# Patient Record
Sex: MALE | Race: WHITE | NOT HISPANIC OR LATINO | Employment: OTHER | URBAN - METROPOLITAN AREA
[De-identification: names, ages, dates, MRNs, and addresses within clinical notes are randomized per-mention and may not be internally consistent; named-entity substitution may affect disease eponyms.]

---

## 2018-06-20 NOTE — PATIENT DISCUSSION
ERM, OU- NO TREATMENT INDICATED AT THIS TIME. CONSIDER CONSULT W/ DR. Joshi Speaker IF PROGRESSES. FOLLOW.

## 2018-07-05 NOTE — PATIENT DISCUSSION
It was explained that there is a higher risk of post-surgical swelling in the retina and that the vision may be limited as a result of the Epiretinal Membrane , however; it is believed that the cataract is contributing to the patient's visual impairment and surgery may significantly improve both the visual and functional status of the patient.

## 2018-07-05 NOTE — PATIENT DISCUSSION
Epiretinal Membrane (ERM) Counseling: An epiretinal membrane (also known as macular pucker, premacular fibrosis, surface wrinkling retinopathy or cellophane maculopathy) is a thin sheet of fibrous tissue that can develop on the surface of the retina. The risk of progression with retinal traction resulting in visual distortion was discussed with the patient. I have explained to the patient that successful management is dependent on patient compliance with treatment as prescribed and/or regular follow-up to monitor for possible progression.

## 2018-07-05 NOTE — PATIENT DISCUSSION
CATARACTS, OU- QUESTIONABLLY VISUALLY SIGNIFICANT. GV PT MRX TODAY FOR NEW GLS.  RTC IN 6 MO AFTER ERM [address] [facility]

## 2018-10-22 NOTE — PATIENT DISCUSSION
General: Patient contacted to schedule ERCP  in the OR. Patient scheduled for 11/05/18 at 7:15am  with 5:15am check in. Reviewed procedure information with patient including nothing to eat or drink for 8 hours prior to procedure, to have a  and to hold any asa/ibuprofen for 7 days prior to procedure.     Patient to have a pre-op physical prior to procedure. Already done just had surgery on 10/10/18    All information mailed to patient.

## 2019-01-15 NOTE — PATIENT DISCUSSION
EPIMACULAR MEMBRANE, OU: PATIENT NOT FUNCTIONALLY BOTHERED. SURGERY NOT RECOMMENDED AT THIS TIME. RETURN AS SCHEDULED FOR OCT / EVALUATION.

## 2019-03-07 NOTE — PATIENT DISCUSSION
CATARACTS, OU - NOT VISUALLY SIGNIFICANT. DISC OPTION OF [id number]. GLASSES RX GIVEN TO FILL IF DESIRES.

## 2021-03-12 ENCOUNTER — CONSULT (OUTPATIENT)
Dept: URBAN - METROPOLITAN AREA CLINIC 46 | Facility: CLINIC | Age: 76
End: 2021-03-12

## 2021-03-12 DIAGNOSIS — H40.1420: ICD-10-CM

## 2021-03-12 DIAGNOSIS — H25.042: ICD-10-CM

## 2021-03-12 DIAGNOSIS — H25.811: ICD-10-CM

## 2021-03-12 PROCEDURE — 92014 COMPRE OPH EXAM EST PT 1/>: CPT

## 2021-03-12 PROCEDURE — 76514 ECHO EXAM OF EYE THICKNESS: CPT

## 2021-03-12 PROCEDURE — 92250 FUNDUS PHOTOGRAPHY W/I&R: CPT

## 2021-03-12 ASSESSMENT — PACHYMETRY
OS_CT_UM: 528
OD_CT_UM: 515

## 2021-03-12 ASSESSMENT — VISUAL ACUITY
OS_CC: 20/40+1
OD_CC: 20/30-1
OD_SC: J3-
OS_SC: J8

## 2021-03-12 ASSESSMENT — TONOMETRY
OD_IOP_MMHG: 12
OS_IOP_MMHG: 13

## 2021-03-26 ENCOUNTER — CONSULT (OUTPATIENT)
Dept: URBAN - METROPOLITAN AREA CLINIC 46 | Facility: CLINIC | Age: 76
End: 2021-03-26

## 2021-03-26 DIAGNOSIS — E11.9: ICD-10-CM

## 2021-03-26 DIAGNOSIS — H18.513: ICD-10-CM

## 2021-03-26 DIAGNOSIS — H25.042: ICD-10-CM

## 2021-03-26 DIAGNOSIS — H25.811: ICD-10-CM

## 2021-03-26 PROCEDURE — 92286 ANT SGM IMG I&R SPECLR MIC: CPT

## 2021-03-26 PROCEDURE — 92136TC INTERFEROMETRY - TECHNICAL COMPONENT

## 2021-03-26 PROCEDURE — 92014 COMPRE OPH EXAM EST PT 1/>: CPT

## 2021-03-26 PROCEDURE — 92025-3 CORNEAL TOPO, REFUSED

## 2021-03-26 RX ORDER — KETOROLAC TROMETHAMINE 5 MG/ML: 1 SOLUTION OPHTHALMIC

## 2021-03-26 RX ORDER — PREDNISOLONE ACETATE 10 MG/ML: 1 SUSPENSION/ DROPS OPHTHALMIC

## 2021-03-26 RX ORDER — MOXIFLOXACIN OPHTHALMIC 5 MG/ML: 1 SOLUTION/ DROPS OPHTHALMIC

## 2021-03-26 ASSESSMENT — VISUAL ACUITY
OD_BAT: 20/400
OS_SC: 20/50+2
OS_PH: 20/30-2
OS_SC: J6
OD_PH: 20/50+2
OD_SC: 20/200+1
OD_SC: J2
OS_BAT: 20/400

## 2021-03-26 ASSESSMENT — TONOMETRY
OD_IOP_MMHG: 12
OS_IOP_MMHG: 12

## 2021-04-12 ENCOUNTER — SURGERY/PROCEDURE (OUTPATIENT)
Dept: URBAN - METROPOLITAN AREA CLINIC 46 | Facility: CLINIC | Age: 76
End: 2021-04-12

## 2021-04-12 ENCOUNTER — PRE-OP/H&P (OUTPATIENT)
Dept: URBAN - METROPOLITAN AREA CLINIC 39 | Facility: CLINIC | Age: 76
End: 2021-04-12

## 2021-04-12 DIAGNOSIS — H25.042: ICD-10-CM

## 2021-04-12 DIAGNOSIS — H25.89: ICD-10-CM

## 2021-04-12 DIAGNOSIS — H25.811: ICD-10-CM

## 2021-04-12 PROCEDURE — 99211HP H&P OFFICE/OUTPATIENT VISIT, EST

## 2021-04-12 PROCEDURE — 66982 XCAPSL CTRC RMVL CPLX WO ECP: CPT

## 2021-04-13 ENCOUNTER — CATARACT POST-OP 1-DAY (OUTPATIENT)
Dept: URBAN - METROPOLITAN AREA CLINIC 46 | Facility: CLINIC | Age: 76
End: 2021-04-13

## 2021-04-13 DIAGNOSIS — Z96.1: ICD-10-CM

## 2021-04-13 PROCEDURE — 99024 POSTOP FOLLOW-UP VISIT: CPT

## 2021-04-13 ASSESSMENT — VISUAL ACUITY
OD_BAT: 20/400
OS_SC: -J12
OS_SC: 20/200
OD_SC: J2
OD_SC: 20/200+1
OS_PH: 20/70+1

## 2021-04-13 ASSESSMENT — TONOMETRY
OD_IOP_MMHG: 12
OS_IOP_MMHG: 12

## 2021-04-19 ENCOUNTER — SURGERY/PROCEDURE (OUTPATIENT)
Dept: URBAN - METROPOLITAN AREA CLINIC 46 | Facility: CLINIC | Age: 76
End: 2021-04-19

## 2021-04-19 ENCOUNTER — POST-OP CATARACT (OUTPATIENT)
Dept: URBAN - METROPOLITAN AREA CLINIC 39 | Facility: CLINIC | Age: 76
End: 2021-04-19

## 2021-04-19 DIAGNOSIS — Z96.1: ICD-10-CM

## 2021-04-19 DIAGNOSIS — H25.811: ICD-10-CM

## 2021-04-19 PROCEDURE — 66984 XCAPSL CTRC RMVL W/O ECP: CPT

## 2021-04-19 ASSESSMENT — VISUAL ACUITY: OS_SC: 20/25-2

## 2021-04-20 ENCOUNTER — CATARACT POST-OP 1-DAY (OUTPATIENT)
Dept: URBAN - METROPOLITAN AREA CLINIC 46 | Facility: CLINIC | Age: 76
End: 2021-04-20

## 2021-04-20 DIAGNOSIS — Z96.1: ICD-10-CM

## 2021-04-20 PROCEDURE — 99024 POSTOP FOLLOW-UP VISIT: CPT

## 2021-04-20 ASSESSMENT — VISUAL ACUITY
OD_SC: 20/30-2
OS_SC: 20/30+2

## 2021-04-20 ASSESSMENT — TONOMETRY
OS_IOP_MMHG: 15
OD_IOP_MMHG: 12

## 2021-05-06 ENCOUNTER — POST-OP CATARACT (OUTPATIENT)
Dept: URBAN - METROPOLITAN AREA CLINIC 46 | Facility: CLINIC | Age: 76
End: 2021-05-06

## 2021-05-06 DIAGNOSIS — Z96.1: ICD-10-CM

## 2021-05-06 PROCEDURE — 99024 POSTOP FOLLOW-UP VISIT: CPT

## 2021-05-06 ASSESSMENT — VISUAL ACUITY
OS_SC: 20/25+2
OD_SC: J5
OS_SC: J5
OD_SC: 20/25-1

## 2021-05-06 ASSESSMENT — TONOMETRY
OD_IOP_MMHG: 12
OS_IOP_MMHG: 13

## 2022-03-03 NOTE — PATIENT DISCUSSION
Patient elects traditional cataract surgery. He understands he will still need glasses after surgery to read small print and to get to his best corrected distance vision.

## 2022-03-03 NOTE — PATIENT DISCUSSION
Patient understands even after cataract surgery his vision may still be limited due to his retina. Patient to get retina clearance from [address] prior to cataract surgery.

## 2022-04-21 NOTE — PATIENT DISCUSSION
ERM limiting BCVA OU. Gave patient option for surgical intervention or monitoring- patient elects to monitor. RTC for annual care.

## 2022-08-08 ENCOUNTER — PREPPED CHART (OUTPATIENT)
Dept: URBAN - METROPOLITAN AREA CLINIC 46 | Facility: CLINIC | Age: 77
End: 2022-08-08

## 2022-10-06 NOTE — PATIENT DISCUSSION
Retinal tear and detachment warning symptoms reviewed and patient instructed to call immediately if increasing floaters, flashes, or decreasing peripheral vision. PLEASE INCLUDE MORE SPECIFIC DOCUMENTATION IN YOUR PROGRESS NOTE AND DISCHARGE SUMMARY.  The documentation in this patient's medical record requires additional clarification to ensure that we accurately capture the patients diagnosis(es), treatment and/or severity of illness. Please document to the greatest level of specificity all corresponding diagnoses (either known or suspected) and/or treatment associated with the clinical information described below.

## 2024-02-29 ENCOUNTER — COMPREHENSIVE EXAM (OUTPATIENT)
Dept: URBAN - METROPOLITAN AREA CLINIC 46 | Facility: CLINIC | Age: 79
End: 2024-02-29

## 2024-02-29 DIAGNOSIS — H17.9: ICD-10-CM

## 2024-02-29 DIAGNOSIS — H26.493: ICD-10-CM

## 2024-02-29 DIAGNOSIS — H04.123: ICD-10-CM

## 2024-02-29 DIAGNOSIS — Z96.1: ICD-10-CM

## 2024-02-29 DIAGNOSIS — H18.513: ICD-10-CM

## 2024-02-29 DIAGNOSIS — E11.9: ICD-10-CM

## 2024-02-29 PROCEDURE — 92014 COMPRE OPH EXAM EST PT 1/>: CPT

## 2024-02-29 PROCEDURE — 92015 DETERMINE REFRACTIVE STATE: CPT

## 2024-02-29 ASSESSMENT — VISUAL ACUITY
OD_SC: J6
OS_SC: J3
OD_SC: 20/25
OS_SC: 20/25-1

## 2024-02-29 ASSESSMENT — TONOMETRY
OS_IOP_MMHG: 10
OD_IOP_MMHG: 10